# Patient Record
Sex: MALE | Race: WHITE
[De-identification: names, ages, dates, MRNs, and addresses within clinical notes are randomized per-mention and may not be internally consistent; named-entity substitution may affect disease eponyms.]

---

## 2017-05-18 ENCOUNTER — HOSPITAL ENCOUNTER (OUTPATIENT)
Dept: HOSPITAL 61 - PCVCIMAG | Age: 72
Discharge: HOME | End: 2017-05-18
Attending: INTERNAL MEDICINE
Payer: MEDICARE

## 2017-05-18 DIAGNOSIS — I25.10: Primary | ICD-10-CM

## 2017-05-18 DIAGNOSIS — Z95.1: ICD-10-CM

## 2017-05-18 DIAGNOSIS — I10: ICD-10-CM

## 2017-05-18 PROCEDURE — 93325 DOPPLER ECHO COLOR FLOW MAPG: CPT

## 2017-05-18 PROCEDURE — 93351 STRESS TTE COMPLETE: CPT

## 2018-02-08 ENCOUNTER — HOSPITAL ENCOUNTER (OUTPATIENT)
Dept: HOSPITAL 61 - PCVCCLINIC | Age: 73
Discharge: HOME | End: 2018-02-08
Attending: INTERNAL MEDICINE
Payer: MEDICARE

## 2018-02-08 DIAGNOSIS — Z88.0: ICD-10-CM

## 2018-02-08 DIAGNOSIS — Z87.891: ICD-10-CM

## 2018-02-08 DIAGNOSIS — I77.1: ICD-10-CM

## 2018-02-08 DIAGNOSIS — Z79.82: ICD-10-CM

## 2018-02-08 DIAGNOSIS — I25.10: Primary | ICD-10-CM

## 2018-02-08 DIAGNOSIS — E78.00: ICD-10-CM

## 2018-02-08 DIAGNOSIS — I73.9: ICD-10-CM

## 2018-02-08 DIAGNOSIS — I45.10: ICD-10-CM

## 2018-02-08 DIAGNOSIS — Z95.1: ICD-10-CM

## 2018-02-08 DIAGNOSIS — I70.1: ICD-10-CM

## 2018-02-08 DIAGNOSIS — R94.31: ICD-10-CM

## 2018-02-08 DIAGNOSIS — I10: ICD-10-CM

## 2018-02-08 PROCEDURE — 93005 ELECTROCARDIOGRAM TRACING: CPT

## 2018-02-08 PROCEDURE — 80061 LIPID PANEL: CPT

## 2018-02-15 ENCOUNTER — HOSPITAL ENCOUNTER (OUTPATIENT)
Dept: HOSPITAL 61 - PCVCIMAG | Age: 73
Discharge: HOME | End: 2018-02-15
Attending: INTERNAL MEDICINE
Payer: MEDICARE

## 2018-02-15 DIAGNOSIS — I10: ICD-10-CM

## 2018-02-15 DIAGNOSIS — G12.9: ICD-10-CM

## 2018-02-15 DIAGNOSIS — I70.1: ICD-10-CM

## 2018-02-15 DIAGNOSIS — I71.4: Primary | ICD-10-CM

## 2018-02-15 PROCEDURE — 93975 VASCULAR STUDY: CPT

## 2018-02-15 PROCEDURE — 93978 VASCULAR STUDY: CPT

## 2018-02-15 PROCEDURE — 76770 US EXAM ABDO BACK WALL COMP: CPT

## 2018-11-08 ENCOUNTER — HOSPITAL ENCOUNTER (OUTPATIENT)
Dept: HOSPITAL 61 - PCVCIMAG | Age: 73
Discharge: HOME | End: 2018-11-08
Attending: INTERNAL MEDICINE
Payer: MEDICARE

## 2018-11-08 DIAGNOSIS — I10: ICD-10-CM

## 2018-11-08 DIAGNOSIS — R09.89: ICD-10-CM

## 2018-11-08 DIAGNOSIS — K55.1: ICD-10-CM

## 2018-11-08 DIAGNOSIS — I71.4: ICD-10-CM

## 2018-11-08 DIAGNOSIS — R01.1: ICD-10-CM

## 2018-11-08 DIAGNOSIS — I73.9: ICD-10-CM

## 2018-11-08 DIAGNOSIS — Z95.1: ICD-10-CM

## 2018-11-08 DIAGNOSIS — I25.10: Primary | ICD-10-CM

## 2018-11-08 PROCEDURE — 93351 STRESS TTE COMPLETE: CPT

## 2018-11-08 PROCEDURE — 93325 DOPPLER ECHO COLOR FLOW MAPG: CPT

## 2018-11-08 NOTE — PCVCIMAG
--------------- APPROVED REPORT --------------





Study performed:  11/08/2018 09:53:03



Exam:  Stress Echocardiogram

Indication: CAD s/p CABG

Stress Nurse: Jocelyn Patricia RN

Status: routine



Ht: 6 ft 0 in  

HR: 58 bpm      BP: 160/80 mmHg

Rhythm: NSR



Medical History

Medical History: CAD s/p CABG

Exercise History: Physically active



Procedure

The patient underwent an Exercise Stress Test using the Stefan 

Protocol. Blood pressure, heart rate, and EKG were monitored.

An Echocardiogram was performed by technician in four stages in quad 

fashion.  At peak stress, four selected images were obtained and 

placed side by side with resting images for comparison.



Stress Test Details

Stress Test:  Exercise stress testing was performed using a Stefan 

protocol.

HR

Resting HR:            58 bpmMax Heart Rate (APMHR): 145 bpm 

Max HR Achieved:  141 bpmTarget HR (85% APMHR): 123 bpm

% of APMHR:         97

Recovery HR:            64 bpm

HR response to stress: Normal HR response to stress



BP

Resting BP:  160/80 mmHg

Max BP:       190/78 mmHg

Recovery BP:       174/80 mmHg



ECG

Resting ECG:  Sinus Rhythm

Stress ECG:     Sinus Rhythm

Recovery ECG: Sinus Rhythm



Clinical

Reason for Termination: Maximal effort

Exercise duration: 11 min sec

Highest Stage Achieved: Stage 4: 4.2 mph at 16% grade. 

Exercise capacity: 13.40 METs

Overall Exercise Capacity for Age: Good



Pre-Stress Echo

The resting Echocardiogram showed normal left ventricular 

contractility with an estimated Ejection Fraction of about 55-60%. 

Normal wall motion in all segments on baseline images.



Post-Stress Echo

The stress Echocardiogram showed normal left ventricular 

contractility with an estimated Ejection Fraction of about 60-65%. 

Normal augmentation of wall motion in all segments on post stress 

images.



Clinical

No clinical or ECG evidence for ischemia.



Conclusion

Clinical Response:  Non-ischemic

Exercise Capacity:  Superior

Stress ECG Response:  Non-ischemic

Stress Echo Images:  Non-ischemic

The left ventricle is normal in size and wall thickness in both the 

rest and stress images.



Other Information

Study Quality: Good



&lt;Conclusion&gt;

The left ventricle is normal in size and wall thickness in both the 

rest and stress images.

## 2019-08-15 ENCOUNTER — HOSPITAL ENCOUNTER (OUTPATIENT)
Dept: HOSPITAL 61 - PCVCCLINIC | Age: 74
Discharge: HOME | End: 2019-08-15
Attending: INTERNAL MEDICINE
Payer: MEDICARE

## 2019-08-15 DIAGNOSIS — R00.1: ICD-10-CM

## 2019-08-15 DIAGNOSIS — Z88.0: ICD-10-CM

## 2019-08-15 DIAGNOSIS — E78.00: ICD-10-CM

## 2019-08-15 DIAGNOSIS — I10: ICD-10-CM

## 2019-08-15 DIAGNOSIS — I25.10: Primary | ICD-10-CM

## 2019-08-15 DIAGNOSIS — E78.5: ICD-10-CM

## 2019-08-15 PROCEDURE — G0463 HOSPITAL OUTPT CLINIC VISIT: HCPCS

## 2019-08-15 PROCEDURE — 36415 COLL VENOUS BLD VENIPUNCTURE: CPT

## 2019-08-15 PROCEDURE — 80061 LIPID PANEL: CPT

## 2019-08-15 PROCEDURE — 93005 ELECTROCARDIOGRAM TRACING: CPT

## 2020-05-12 ENCOUNTER — HOSPITAL ENCOUNTER (OUTPATIENT)
Dept: HOSPITAL 35 - SJCVCIMAG | Age: 75
End: 2020-05-12
Attending: INTERNAL MEDICINE
Payer: COMMERCIAL

## 2020-05-12 DIAGNOSIS — I65.23: Primary | ICD-10-CM

## 2020-05-12 DIAGNOSIS — I25.10: ICD-10-CM

## 2020-05-12 DIAGNOSIS — Z95.1: ICD-10-CM

## 2021-02-12 ENCOUNTER — HOSPITAL ENCOUNTER (OUTPATIENT)
Dept: HOSPITAL 35 - SJCVC | Age: 76
End: 2021-02-12
Attending: INTERNAL MEDICINE
Payer: COMMERCIAL

## 2021-02-12 DIAGNOSIS — Z79.82: ICD-10-CM

## 2021-02-12 DIAGNOSIS — Z88.0: ICD-10-CM

## 2021-02-12 DIAGNOSIS — R94.31: ICD-10-CM

## 2021-02-12 DIAGNOSIS — R00.1: ICD-10-CM

## 2021-02-12 DIAGNOSIS — Z87.891: ICD-10-CM

## 2021-02-12 DIAGNOSIS — Z98.890: ICD-10-CM

## 2021-02-12 DIAGNOSIS — I45.10: Primary | ICD-10-CM

## 2021-02-12 DIAGNOSIS — K55.1: ICD-10-CM

## 2021-02-12 DIAGNOSIS — E78.00: ICD-10-CM

## 2021-02-12 DIAGNOSIS — Z72.89: ICD-10-CM

## 2021-02-12 DIAGNOSIS — Z95.1: ICD-10-CM

## 2021-02-12 DIAGNOSIS — I10: ICD-10-CM

## 2021-02-12 DIAGNOSIS — I25.810: ICD-10-CM

## 2021-02-12 DIAGNOSIS — I44.7: ICD-10-CM

## 2021-02-12 DIAGNOSIS — I65.29: ICD-10-CM

## 2021-02-12 DIAGNOSIS — Z79.899: ICD-10-CM

## 2021-02-12 DIAGNOSIS — Z90.89: ICD-10-CM

## 2021-05-13 ENCOUNTER — HOSPITAL ENCOUNTER (OUTPATIENT)
Dept: HOSPITAL 35 - RAD | Age: 76
End: 2021-05-13
Attending: NEUROLOGICAL SURGERY
Payer: COMMERCIAL

## 2021-05-13 DIAGNOSIS — M48.062: Primary | ICD-10-CM

## 2021-05-13 DIAGNOSIS — M41.86: ICD-10-CM

## 2021-11-11 ENCOUNTER — HOSPITAL ENCOUNTER (OUTPATIENT)
Dept: HOSPITAL 35 - SJCVCIMAG | Age: 76
End: 2021-11-11
Attending: INTERNAL MEDICINE
Payer: COMMERCIAL

## 2021-11-11 DIAGNOSIS — R06.00: ICD-10-CM

## 2021-11-11 DIAGNOSIS — R00.1: ICD-10-CM

## 2021-11-11 DIAGNOSIS — I70.8: ICD-10-CM

## 2021-11-11 DIAGNOSIS — E78.5: ICD-10-CM

## 2021-11-11 DIAGNOSIS — Z87.891: ICD-10-CM

## 2021-11-11 DIAGNOSIS — Z95.1: ICD-10-CM

## 2021-11-11 DIAGNOSIS — R94.31: Primary | ICD-10-CM

## 2021-11-11 DIAGNOSIS — Z88.0: ICD-10-CM

## 2021-11-11 DIAGNOSIS — I65.29: ICD-10-CM

## 2021-11-11 DIAGNOSIS — I45.10: ICD-10-CM

## 2021-11-11 DIAGNOSIS — Z79.82: ICD-10-CM

## 2021-11-11 DIAGNOSIS — Z72.89: ICD-10-CM

## 2021-11-11 DIAGNOSIS — I71.4: ICD-10-CM

## 2021-11-11 DIAGNOSIS — E78.00: ICD-10-CM

## 2021-11-11 DIAGNOSIS — I10: ICD-10-CM

## 2021-11-11 DIAGNOSIS — Z79.899: ICD-10-CM

## 2021-11-11 DIAGNOSIS — R01.1: ICD-10-CM

## 2021-11-11 DIAGNOSIS — K55.1: ICD-10-CM

## 2021-11-11 DIAGNOSIS — I25.10: ICD-10-CM

## 2021-11-20 ENCOUNTER — HOSPITAL ENCOUNTER (INPATIENT)
Dept: HOSPITAL 35 - ER | Age: 76
LOS: 3 days | Discharge: HOME | DRG: 418 | End: 2021-11-23
Attending: INTERNAL MEDICINE | Admitting: INTERNAL MEDICINE
Payer: COMMERCIAL

## 2021-11-20 VITALS — SYSTOLIC BLOOD PRESSURE: 148 MMHG | DIASTOLIC BLOOD PRESSURE: 49 MMHG

## 2021-11-20 VITALS — WEIGHT: 160 LBS | HEIGHT: 72.01 IN | BODY MASS INDEX: 21.67 KG/M2

## 2021-11-20 VITALS — DIASTOLIC BLOOD PRESSURE: 65 MMHG | SYSTOLIC BLOOD PRESSURE: 157 MMHG

## 2021-11-20 VITALS — DIASTOLIC BLOOD PRESSURE: 91 MMHG | SYSTOLIC BLOOD PRESSURE: 138 MMHG

## 2021-11-20 VITALS — SYSTOLIC BLOOD PRESSURE: 153 MMHG | DIASTOLIC BLOOD PRESSURE: 65 MMHG

## 2021-11-20 VITALS — SYSTOLIC BLOOD PRESSURE: 151 MMHG | DIASTOLIC BLOOD PRESSURE: 78 MMHG

## 2021-11-20 DIAGNOSIS — Z90.49: ICD-10-CM

## 2021-11-20 DIAGNOSIS — Z88.0: ICD-10-CM

## 2021-11-20 DIAGNOSIS — Z20.822: ICD-10-CM

## 2021-11-20 DIAGNOSIS — Z79.899: ICD-10-CM

## 2021-11-20 DIAGNOSIS — Z79.82: ICD-10-CM

## 2021-11-20 DIAGNOSIS — Z95.1: ICD-10-CM

## 2021-11-20 DIAGNOSIS — K81.0: Primary | ICD-10-CM

## 2021-11-20 DIAGNOSIS — R73.9: ICD-10-CM

## 2021-11-20 DIAGNOSIS — E87.1: ICD-10-CM

## 2021-11-20 DIAGNOSIS — I25.10: ICD-10-CM

## 2021-11-20 DIAGNOSIS — I10: ICD-10-CM

## 2021-11-20 LAB
ALBUMIN SERPL-MCNC: 4.6 G/DL (ref 3.4–5)
ALT SERPL-CCNC: 33 U/L (ref 30–65)
ANION GAP SERPL CALC-SCNC: 9 MMOL/L (ref 7–16)
AST SERPL-CCNC: 46 U/L (ref 15–37)
BASOPHILS NFR BLD AUTO: 0.3 % (ref 0–2)
BILIRUB SERPL-MCNC: 0.8 MG/DL (ref 0.2–1)
BILIRUB UR-MCNC: NEGATIVE MG/DL
BUN SERPL-MCNC: 20 MG/DL (ref 7–18)
CALCIUM SERPL-MCNC: 9.8 MG/DL (ref 8.5–10.1)
CHLORIDE SERPL-SCNC: 91 MMOL/L (ref 98–107)
CO2 SERPL-SCNC: 28 MMOL/L (ref 21–32)
COLOR UR: YELLOW
CREAT SERPL-MCNC: 1.3 MG/DL (ref 0.7–1.3)
EOSINOPHIL NFR BLD: 0.2 % (ref 0–3)
ERYTHROCYTE [DISTWIDTH] IN BLOOD BY AUTOMATED COUNT: 14 % (ref 10.5–14.5)
GLUCOSE SERPL-MCNC: 150 MG/DL (ref 74–106)
GRANULOCYTES NFR BLD MANUAL: 90.7 % (ref 36–66)
HCT VFR BLD CALC: 43.6 % (ref 42–52)
HGB BLD-MCNC: 14.5 GM/DL (ref 14–18)
KETONES UR STRIP-MCNC: (no result) MG/DL
LIPASE: 139 U/L (ref 73–393)
LYMPHOCYTES NFR BLD AUTO: 5.3 % (ref 24–44)
MCH RBC QN AUTO: 30.7 PG (ref 26–34)
MCHC RBC AUTO-ENTMCNC: 33.2 G/DL (ref 28–37)
MCV RBC: 92.6 FL (ref 80–100)
MONOCYTES NFR BLD: 3.5 % (ref 1–8)
NEUTROPHILS # BLD: 13.5 THOU/UL (ref 1.4–8.2)
PLATELET # BLD: 250 THOU/UL (ref 150–400)
POTASSIUM SERPL-SCNC: 4 MMOL/L (ref 3.5–5.1)
PROT SERPL-MCNC: 7.9 G/DL (ref 6.4–8.2)
RBC # BLD AUTO: 4.71 MIL/UL (ref 4.5–6)
RBC # UR STRIP: NEGATIVE /UL
SODIUM SERPL-SCNC: 128 MMOL/L (ref 136–145)
SP GR UR STRIP: 1.02 (ref 1–1.03)
URINE CLARITY: CLEAR
URINE GLUCOSE-RANDOM*: NEGATIVE
URINE LEUKOCYTES-REFLEX: NEGATIVE
URINE NITRITE-REFLEX: NEGATIVE
URINE PROTEIN (DIPSTICK): NEGATIVE
UROBILINOGEN UR STRIP-ACNC: 0.2 E.U./DL (ref 0.2–1)
WBC # BLD AUTO: 14.9 THOU/UL (ref 4–11)

## 2021-11-20 PROCEDURE — 56525: CPT

## 2021-11-20 PROCEDURE — 53312: CPT

## 2021-11-20 PROCEDURE — 62900: CPT

## 2021-11-20 PROCEDURE — 58574: CPT

## 2021-11-20 PROCEDURE — 50411: CPT

## 2021-11-20 PROCEDURE — 53307: CPT

## 2021-11-20 PROCEDURE — 51489: CPT

## 2021-11-20 PROCEDURE — 10195: CPT

## 2021-11-20 PROCEDURE — 52265 CYSTOSCOPY AND TREATMENT: CPT

## 2021-11-20 PROCEDURE — 51297: CPT

## 2021-11-20 PROCEDURE — 55245: CPT

## 2021-11-20 PROCEDURE — 50555 KIDNEY ENDOSCOPY & BIOPSY: CPT

## 2021-11-20 PROCEDURE — 50101: CPT

## 2021-11-20 PROCEDURE — 55317: CPT

## 2021-11-20 PROCEDURE — 62110: CPT

## 2021-11-20 PROCEDURE — 56462: CPT

## 2021-11-20 PROCEDURE — 53314: CPT

## 2021-11-20 PROCEDURE — 70005: CPT

## 2021-11-20 PROCEDURE — 52266: CPT

## 2021-11-20 PROCEDURE — 56526: CPT

## 2021-11-20 NOTE — NUR
ASSUMED PT CARE AT 1730 FROM ED. PT IS ALERT & ORIENTED X4. PT HAS IV SITE ON
RAC. PT IS ON ROOM AIR. PT RATED PAIN 1/10 ON ABDOMEN. PT LAST BM WAS TODAY.
PT IS ACCUCHECK ACHS. FINISHED ADMISSION. PT WIFE IS AT THE BEDSIDE. PT IS UP
AD RAUL. WILL CONTINUE TO MONITOR PT. FOLLOW POC.

## 2021-11-21 VITALS — DIASTOLIC BLOOD PRESSURE: 50 MMHG | SYSTOLIC BLOOD PRESSURE: 137 MMHG

## 2021-11-21 VITALS — SYSTOLIC BLOOD PRESSURE: 112 MMHG | DIASTOLIC BLOOD PRESSURE: 60 MMHG

## 2021-11-21 VITALS — SYSTOLIC BLOOD PRESSURE: 116 MMHG | DIASTOLIC BLOOD PRESSURE: 46 MMHG

## 2021-11-21 VITALS — SYSTOLIC BLOOD PRESSURE: 146 MMHG | DIASTOLIC BLOOD PRESSURE: 78 MMHG

## 2021-11-21 VITALS — SYSTOLIC BLOOD PRESSURE: 134 MMHG | DIASTOLIC BLOOD PRESSURE: 51 MMHG

## 2021-11-21 LAB
ALBUMIN SERPL-MCNC: 3.5 G/DL (ref 3.4–5)
ALT SERPL-CCNC: 141 U/L (ref 16–63)
ANION GAP SERPL CALC-SCNC: 7 MMOL/L (ref 7–16)
AST SERPL-CCNC: 168 U/L (ref 15–37)
BILIRUB SERPL-MCNC: 2.4 MG/DL (ref 0.2–1)
BUN SERPL-MCNC: 23 MG/DL (ref 7–18)
CALCIUM SERPL-MCNC: 8.9 MG/DL (ref 8.5–10.1)
CHLORIDE SERPL-SCNC: 93 MMOL/L (ref 98–107)
CO2 SERPL-SCNC: 28 MMOL/L (ref 21–32)
CREAT SERPL-MCNC: 1.4 MG/DL (ref 0.7–1.3)
EST. AVERAGE GLUCOSE BLD GHB EST-MCNC: 117 MG/DL
GLUCOSE SERPL-MCNC: 146 MG/DL (ref 74–106)
GLYCOHEMOGLOBIN (HGB A1C): 5.7 % (ref 4.8–5.6)
POTASSIUM SERPL-SCNC: 4.4 MMOL/L (ref 3.5–5.1)
PROT SERPL-MCNC: 6.5 G/DL (ref 6.4–8.2)
SODIUM SERPL-SCNC: 128 MMOL/L (ref 136–145)

## 2021-11-21 NOTE — NUR
PT AMBULATING TO BATHROMM INDEPENDENTLY AND IS TOLERATING WELL.  TYLENOL
PROVIDING PAIN RELIEF.  DENIES NAUSEA.  PLAN FOR LAP LUDMILA 11/21.  RESTING
COMFORTABLY.  NO NEEDS VOICED.  CALL LIGHT WITHIN REACH.  FREQUENT
OBSERVATION.

## 2021-11-21 NOTE — NUR
ASSUMED PT CARE THIS AM. PT IS ALERT & ORIENTED X4. PT IS UP AD RAUL. PT IS ON
ROOM AIR. PT IS CURRENTLY HAVING LAP LUDMILA THIS AM. AWAITING FOR PT TO COME
BACK FROM SURGERY. WILL CONTINUE TO MONITOR PT. FOLLOW POC.

## 2021-11-22 VITALS — DIASTOLIC BLOOD PRESSURE: 51 MMHG | SYSTOLIC BLOOD PRESSURE: 99 MMHG

## 2021-11-22 VITALS — SYSTOLIC BLOOD PRESSURE: 146 MMHG | DIASTOLIC BLOOD PRESSURE: 64 MMHG

## 2021-11-22 VITALS — DIASTOLIC BLOOD PRESSURE: 64 MMHG | SYSTOLIC BLOOD PRESSURE: 127 MMHG

## 2021-11-22 VITALS — DIASTOLIC BLOOD PRESSURE: 56 MMHG | SYSTOLIC BLOOD PRESSURE: 111 MMHG

## 2021-11-22 LAB
ALBUMIN SERPL-MCNC: 2.7 G/DL (ref 3.4–5)
ALT SERPL-CCNC: 93 U/L (ref 30–65)
ANION GAP SERPL CALC-SCNC: 8 MMOL/L (ref 7–16)
AST SERPL-CCNC: 88 U/L (ref 15–37)
BILIRUB SERPL-MCNC: 0.9 MG/DL (ref 0.2–1)
BUN SERPL-MCNC: 23 MG/DL (ref 7–18)
CALCIUM SERPL-MCNC: 8.2 MG/DL (ref 8.5–10.1)
CHLORIDE SERPL-SCNC: 85 MMOL/L (ref 98–107)
CO2 SERPL-SCNC: 23 MMOL/L (ref 21–32)
CREAT SERPL-MCNC: 1.1 MG/DL (ref 0.7–1.3)
GLUCOSE SERPL-MCNC: 143 MG/DL (ref 74–106)
POTASSIUM SERPL-SCNC: 4.1 MMOL/L (ref 3.5–5.1)
PROT SERPL-MCNC: 5.4 G/DL (ref 6.4–8.2)
SODIUM SERPL-SCNC: 116 MMOL/L (ref 136–145)

## 2021-11-22 NOTE — NUR
75 y/o male  c/o epigastric abdominal pain and back pain. Pain is sharp,
stabbing, located in the right upper quadrant and epigastric.  CT in ED shows
cholecystitis. Had a Lap baltazar 11/21/21
 
Chart review. Cm visited with cyn at bedside. He is a & o x 4, and able to
make his needs known. Lives at home with his wife Ric. 3 steps to enter the
home. Manage own medication. Drives . He was doing outpatient therapy for his
back at [facility] in [facility].  No anticipated  needs at [place].

## 2021-11-22 NOTE — NUR
PT ASSESSED AT START OF SHIFT. NO C/O ABD PAIN. LAP SITES WELL APPROXIMATED.
LAB SODIUM 120 CALLED TO DR. FISHER AND IV FLUIDS CHANGED. DR. CABAN IN
TO SEE PT AND INFORMED OF LABS. PLAN FOR PT TO STAY TO CORRECT SODIUM BEFORE
DISCHARGE. PT MILDLY OFF THIS AM BUT NOTICABLE CHANGE IN NEURO AFFECT LATER
AFTERNOON. AMBULATING STEADY IN HALLS. EATING AND DRINKING WELL.

## 2021-11-22 NOTE — EKG
[address] [place]
  [address]
Phone:  [phone]                    ELECTROCARDIOGRAM REPORT      
_______________________________________________________________________________
 
Name:       MARY LYON              Room #:         443-P       ADM IN  
M.R.#:      [id number]     Account #:      [id number]  
Admission:  21    Attend Phys:    Alexander Bustillos
Discharge:              Date of Birth:  [birth year]  
                                                          Report #: [id number]
   [id number]
_______________________________________________________________________________
                         [facility] ED
                                       
Test Date:    2021               Test Time:    09:52:15
Pat Name:     MARY LYON           Department:   
Patient ID:   [id number]            Room:         443
Gender:       M                        Technician:   KOBE
:          1945               Requested By: Nestor Ugalde
Order Number: [id number] MD:   Jose G Muir
                                 Measurements
Intervals                              Axis          
Rate:         48                       P:            0
IN:           260                      QRS:          83
QRSD:         112                      T:            60
QT:           457                                    
QTc:          409                                    
                           Interpretive Statements
Sinus bradycardia
Prolonged IN interval
Incomplete right bundle branch block
Compared to ECG 2011 20:07:32
Ventricular premature complex(es) now present
First degree AV block now present
ST (T wave) deviation no longer present
Early repolarization no longer present
Electronically Signed On 2021 7:35:40 CST by Jose G Muir
[email or web address]
 
 
 
 
 
 
 
 
 
 
 
 
 
 
 
 
 
  <ELECTRONICALLY SIGNED>
   By: Jose G Muir MD, FACC    
  21     0735
D: 21 0952                           _____________________________________
T: 21 0952                           Jose G Muir MD, FAC      /EPI

## 2021-11-22 NOTE — NUR
PT AMBULATING IN ROOM INDEPENDENTLY AND IS TOLERATING WELL.  TYLENOL PROVIDNG
PAIN RELIEF.  RESTING COMFORTABLY.  NO NEEDS VOICED.  CALL LIGHT WITHIN REACH.
FREQUENT OBSERVATION.

## 2021-11-23 VITALS — SYSTOLIC BLOOD PRESSURE: 139 MMHG | DIASTOLIC BLOOD PRESSURE: 71 MMHG

## 2021-11-23 VITALS — DIASTOLIC BLOOD PRESSURE: 71 MMHG | SYSTOLIC BLOOD PRESSURE: 139 MMHG

## 2021-11-23 LAB
ALBUMIN SERPL-MCNC: 2.8 G/DL (ref 3.4–5)
ANION GAP SERPL CALC-SCNC: 9 MMOL/L (ref 7–16)
BUN SERPL-MCNC: 25 MG/DL (ref 7–18)
CALCIUM SERPL-MCNC: 8.4 MG/DL (ref 8.5–10.1)
CHLORIDE SERPL-SCNC: 91 MMOL/L (ref 98–107)
CO2 SERPL-SCNC: 23 MMOL/L (ref 21–32)
CREAT SERPL-MCNC: 1 MG/DL (ref 0.7–1.3)
GLUCOSE SERPL-MCNC: 97 MG/DL (ref 74–106)
PHOSPHATE SERPL-MCNC: 3 MG/DL (ref 2.5–4.9)
POTASSIUM SERPL-SCNC: 4.2 MMOL/L (ref 3.5–5.1)
SODIUM SERPL-SCNC: 123 MMOL/L (ref 136–145)

## 2021-11-23 NOTE — NUR
A/O X 4. ROOM AIR. AD RAUL. LEFT FOREARM IV D/C'D-GAUZE AND TAPE APPILED FOR
D/C HOME. 5 ABD LAPSITES-DERMOBOND, PAIN 0/10 ONLY SLIGHT DISCOMFORT WITH
MOVEMENT. NO PAIN MEDS NEEDED. D/C HOME. REFUSED A WHEELCHAIR TO BE WHEELED
OUT BY STAFF. D/C TEACHING COMPLETED.

## 2021-11-23 NOTE — NUR
ASSESSMENT COMPLETED.PT ALERT AND ORIENTED. VOIDING ADEQUATELY,TYLENOL GIVEN
FOR GENERALISED ACHINESS.UP AD RAUL, WALKED DOWN THE HALLWAY.AFEBRILE.LOOKING
FORWARD TO GOING HOME TODAY.

## 2021-11-29 NOTE — O
[facility]
[address]
[place]   [address]                     OPERATIVE REPORT              
_______________________________________________________________________________
 
Name:       MARY LYON              Room #:         443-P       [facility] [place]  
M.R.#:      [id number]                       Account #:      [id number]  
Admission:  11/20/21    Attend Phys:    Alexander Froilan Tressa
Discharge:  11/23/21    Date of Birth:  [birth year]  
                                                          Report #: [id number]
                                                                    [id number] 
_______________________________________________________________________________
THIS REPORT FOR:  
 
cc:  Funmilayo Ashley MD, Sara A. MD Patterson, Jonathan D. MD                                     ~
 
DATE OF SERVICE: 11/21/2021
 
PREOPERATIVE DIAGNOSIS:  Acute cholecystitis.
 
POSTOPERATIVE DIAGNOSIS:  Acute cholecystitis.
 
OPERATION:  Laparoscopic cholecystectomy with intraoperative cholangiogram.
 
SURGEON:  Jordon Delcid MD
 
ANESTHESIA:  General.
 
ESTIMATED BLOOD LOSS:  Minimal.
 
SPECIMENS:  Gallbladder.
 
DESCRIPTION OF PROCEDURE:  After informed consent was obtained, the patient was 
brought to the operating room and placed supine.  SCDs were placed and working, 
preoperative antibiotics were administered, general anesthesia was induced.  The
abdomen was prepped and draped in the usual sterile fashion.  A 10 mm incision 
was made below the umbilicus.  Fascia was incised and a trocar was placed.  
Pneumoperitoneum was established.  Three right upper quadrant 5 mm ports were 
placed.  Gallbladder was grasped and retracted cephalad.  Infundibulum was 
grasped and retracted laterally.  I dissected out the cystic duct and cystic 
artery.  Cystic duct was clipped.  A cholangiogram catheter was inserted.  
Cholangiogram was performed.  This demonstrated filling of the cystic duct, 
common bile duct, common hepatic duct, bifurcation of the hepatics, smooth easy 
flow into duodenum without any filling defects.  This was normal.  Gallbladder 
was then grasped and the cystic duct was clipped and ligated leaving a PDS 
Endoloop on the remaining duct.  Cystic artery was clipped and ligated.  
Gallbladder was then taken off the liver bed with electrocautery.  It was placed
into an Endopouch and removed.  Fascia was then closed with a figure-of-eight 0 
Vicryl.  Skin was closed with 4-0 Monocryl.  Incisions were dressed with 
Steri-Strips.
 
COMPLICATIONS:  None.
 
 
 
[facility]
[address] [place]   [address]                     OPERATIVE REPORT              
_______________________________________________________________________________
 
Name:       MARY LYON              Room #:         443-P       [facility] [place]  
..#:      [id number]                       Account #:      [id number]  
Admission:  11/20/21    Attend Phys:    Alexander Bustillos
Discharge:  11/23/21    Date of Birth:  [birth year]  
                                                          Report #: [id number]
                                                                    [id number] 
_______________________________________________________________________________
 
 
DISPOSITION:  The patient was taken to recovery in satisfactory condition.
 
 
 
 
 
 
 
 
 
 
 
 
 
 
 
 
 
 
 
 
 
 
 
 
 
 
 
 
 
 
 
 
 
 
 
 
 
 
 
 
 
  <ELECTRONICALLY SIGNED>
   By: Jordon Delcid MD     
  11/29/21     1227
D: 11/21/21 0921                           _____________________________________
T: 11/21/21 0925                           Jordon Delcid MD       /nt

## 2022-01-12 ENCOUNTER — HOSPITAL ENCOUNTER (OUTPATIENT)
Dept: HOSPITAL 35 - SJCVCIMAG | Age: 77
End: 2022-01-12
Attending: INTERNAL MEDICINE
Payer: COMMERCIAL

## 2022-01-12 DIAGNOSIS — Z95.1: ICD-10-CM

## 2022-01-12 DIAGNOSIS — I25.10: ICD-10-CM

## 2022-01-12 DIAGNOSIS — I08.1: Primary | ICD-10-CM
